# Patient Record
Sex: MALE | Race: WHITE | NOT HISPANIC OR LATINO | Employment: FULL TIME | URBAN - NONMETROPOLITAN AREA
[De-identification: names, ages, dates, MRNs, and addresses within clinical notes are randomized per-mention and may not be internally consistent; named-entity substitution may affect disease eponyms.]

---

## 2018-02-07 ENCOUNTER — CONSULT (OUTPATIENT)
Dept: SURGERY | Facility: CLINIC | Age: 38
End: 2018-02-07

## 2018-02-07 VITALS
HEIGHT: 75 IN | DIASTOLIC BLOOD PRESSURE: 88 MMHG | SYSTOLIC BLOOD PRESSURE: 130 MMHG | WEIGHT: 287 LBS | BODY MASS INDEX: 35.68 KG/M2

## 2018-02-07 DIAGNOSIS — K60.2 ANAL FISSURE: Primary | ICD-10-CM

## 2018-02-07 PROCEDURE — 99203 OFFICE O/P NEW LOW 30 MIN: CPT | Performed by: SURGERY

## 2018-02-07 RX ORDER — ATORVASTATIN CALCIUM 20 MG/1
TABLET, FILM COATED ORAL
Refills: 2 | COMMUNITY
Start: 2018-01-19

## 2018-02-07 RX ORDER — CITALOPRAM 20 MG/1
TABLET ORAL
Refills: 2 | COMMUNITY
Start: 2017-12-29

## 2018-02-07 RX ORDER — DESMOPRESSIN ACETATE 0.2 MG/1
TABLET ORAL
Refills: 2 | COMMUNITY
Start: 2018-01-19

## 2018-02-07 NOTE — PROGRESS NOTES
Subjective   Rob Acuna is a 37 y.o. male     History of Present Illness referred by Dr. Graham for possible symptomatic hemorrhoidal disease.  Patient describes a many month if not year history of intermittent bleeding and perianal pain especially after bowel movements.  Patient had a colonoscopy in 2013 and was told he had polyps but there is no record of any follow-up on our computer system.  Patient was told he had diverticulosis as well.  Patient's been using THE VARIOUS OVER-THE-COUNTER CREAMS WITHOUT IMPROVEMENT.  HE STARTED USING FIBER to days ago and feels like he is doing better currently.        Review of Systems   Constitutional: Negative.    HENT: Negative.    Eyes: Negative.    Respiratory: Negative.    Cardiovascular: Negative.    Gastrointestinal: Positive for constipation.        Hemorrhoids   Endocrine: Negative.    Genitourinary: Negative.    Musculoskeletal: Negative.    Skin: Negative.    Allergic/Immunologic: Negative.    Neurological: Negative.    Hematological: Negative.    Psychiatric/Behavioral: Negative.      Past Medical History:   Diagnosis Date   • Alpha-1-antitrypsin deficiency     Alpha-1-antitrypsin deficiency - possible, +FH liver and pulmonary dz    • Diverticular disease of colon    • Elevated levels of transaminase & lactic acid dehydrogenase    • History of arthroscopy of left knee 02/14/2017   • Non-alcoholic fatty liver disease     weight loss     Past Surgical History:   Procedure Laterality Date   • ENDOSCOPY AND COLONOSCOPY  05/29/2013    (1)    Diverticulum in sigmoid colon & descending colon. Internal & external hemorrhoids found.    • KNEE ARTHROSCOPY Left      Family History   Problem Relation Age of Onset   • Liver disease Other      fathers' side   • Diabetes Father    • Diabetes Maternal Grandmother    • Bleeding Disorder Maternal Grandfather      Social History     Social History   • Marital status:      Spouse name: N/A   • Number of children:  N/A   • Years of education: N/A     Occupational History   • Not on file.     Social History Main Topics   • Smoking status: Current Some Day Smoker   • Smokeless tobacco: Current User     Types: Chew   • Alcohol use Not on file   • Drug use: Not on file   • Sexual activity: Not on file     Other Topics Concern   • Not on file     Social History Narrative     Allergies no known allergies    Home Medications:  Prior to Admission medications    Medication Sig Start Date End Date Taking? Authorizing Provider   atorvastatin (LIPITOR) 20 MG tablet TAKE 1 TABLET BY MOUTH DAILY 1/19/18  Yes Historical Provider, MD   citalopram (CeleXA) 20 MG tablet TAKE 1 TABLET BY MOUTH DAILY 12/29/17  Yes Historical Provider, MD   desmopressin (DDAVP) 0.2 MG tablet TAKE 3 TABLETS BY MOUTH EVERY NIGHT AT BEDTIME 1/19/18  Yes Historical Provider, MD   hydrocortisone 2.5 % cream USE ONE APPLICATION THREE TIMES A DAY AS NEEDED TO RECTAL AREA 1/17/18  Yes Historical Provider, MD       Objective   Physical Exam   Constitutional: He is oriented to person, place, and time. He appears well-developed and well-nourished. No distress.   HENT:   Head: Normocephalic and atraumatic.   Nose: Nose normal.   Eyes: Conjunctivae are normal.   Neck: Normal range of motion.   Cardiovascular: Normal rate, regular rhythm and normal heart sounds.    Pulmonary/Chest: Effort normal and breath sounds normal.   Abdominal: Soft. He exhibits no distension. There is no tenderness. There is no rebound and no guarding. No hernia.   Musculoskeletal: He exhibits no tenderness or deformity.   Neurological: He is alert and oriented to person, place, and time.   Skin: Skin is warm and dry. No rash noted.   Psychiatric: He has a normal mood and affect. His behavior is normal. Judgment and thought content normal.   Vitals reviewed.   perianal area there is no external lesions there is a scar on the posterior midline consistent with a chronic anal fissure but patient is not  tender at that site.  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  Recommend he continue with the fiber supplement on a daily basis as we discussed.  Sits baths if he has any flareups.  Would like him to follow up with us in 2-3 weeks.  We'll attempt to look at his last colonoscopy report to see if he needs to have any further colonoscopy.  Fully discussed this with him he agrees to return in 2-3 weeks.      There were no encounter diagnoses.                     This document has been electronically signed by Ashleigh Fuller CSA on February 7, 2018 11:18 AM